# Patient Record
Sex: FEMALE | Race: WHITE | NOT HISPANIC OR LATINO | Employment: FULL TIME | URBAN - METROPOLITAN AREA
[De-identification: names, ages, dates, MRNs, and addresses within clinical notes are randomized per-mention and may not be internally consistent; named-entity substitution may affect disease eponyms.]

---

## 2023-02-18 ENCOUNTER — APPOINTMENT (OUTPATIENT)
Dept: GENERAL RADIOLOGY | Age: 53
End: 2023-02-18
Attending: EMERGENCY MEDICINE

## 2023-02-18 ENCOUNTER — HOSPITAL ENCOUNTER (EMERGENCY)
Age: 53
Discharge: HOME | End: 2023-02-18

## 2023-02-18 VITALS
HEART RATE: 69 BPM | RESPIRATION RATE: 18 BRPM | WEIGHT: 194.12 LBS | BODY MASS INDEX: 26.29 KG/M2 | TEMPERATURE: 97.6 F | HEIGHT: 72 IN | SYSTOLIC BLOOD PRESSURE: 114 MMHG | DIASTOLIC BLOOD PRESSURE: 73 MMHG | OXYGEN SATURATION: 99 %

## 2023-02-18 DIAGNOSIS — S52.501A CLOSED FRACTURE OF DISTAL END OF RIGHT RADIUS, UNSPECIFIED FRACTURE MORPHOLOGY, INITIAL ENCOUNTER: Primary | ICD-10-CM

## 2023-02-18 PROCEDURE — 73130 X-RAY EXAM OF HAND: CPT | Performed by: RADIOLOGY

## 2023-02-18 PROCEDURE — 73110 X-RAY EXAM OF WRIST: CPT

## 2023-02-18 PROCEDURE — 10002803 HB RX 637: Performed by: STUDENT IN AN ORGANIZED HEALTH CARE EDUCATION/TRAINING PROGRAM

## 2023-02-18 PROCEDURE — 99283 EMERGENCY DEPT VISIT LOW MDM: CPT

## 2023-02-18 PROCEDURE — 99284 EMERGENCY DEPT VISIT MOD MDM: CPT | Performed by: STUDENT IN AN ORGANIZED HEALTH CARE EDUCATION/TRAINING PROGRAM

## 2023-02-18 PROCEDURE — 73110 X-RAY EXAM OF WRIST: CPT | Performed by: RADIOLOGY

## 2023-02-18 PROCEDURE — A4565 SLINGS: HCPCS | Performed by: STUDENT IN AN ORGANIZED HEALTH CARE EDUCATION/TRAINING PROGRAM

## 2023-02-18 PROCEDURE — 73130 X-RAY EXAM OF HAND: CPT

## 2023-02-18 PROCEDURE — 29125 APPL SHORT ARM SPLINT STATIC: CPT | Performed by: STUDENT IN AN ORGANIZED HEALTH CARE EDUCATION/TRAINING PROGRAM

## 2023-02-18 RX ORDER — ONDANSETRON 4 MG/1
4 TABLET, ORALLY DISINTEGRATING ORAL EVERY 6 HOURS
Qty: 10 TABLET | Refills: 0 | Status: SHIPPED | OUTPATIENT
Start: 2023-02-18

## 2023-02-18 RX ORDER — HYDROCODONE BITARTRATE AND ACETAMINOPHEN 5; 325 MG/1; MG/1
1 TABLET ORAL ONCE
Status: COMPLETED | OUTPATIENT
Start: 2023-02-18 | End: 2023-02-18

## 2023-02-18 RX ORDER — IBUPROFEN 800 MG/1
800 TABLET ORAL ONCE
Status: COMPLETED | OUTPATIENT
Start: 2023-02-18 | End: 2023-02-18

## 2023-02-18 RX ORDER — LEVOTHYROXINE SODIUM 112 UG/1
112 TABLET ORAL
COMMUNITY
Start: 2023-01-19

## 2023-02-18 RX ORDER — HYDROCODONE BITARTRATE AND ACETAMINOPHEN 5; 325 MG/1; MG/1
1 TABLET ORAL EVERY 6 HOURS PRN
Qty: 12 TABLET | Refills: 0 | Status: SHIPPED | OUTPATIENT
Start: 2023-02-18 | End: 2023-02-21

## 2023-02-18 RX ORDER — ONDANSETRON 4 MG/1
4 TABLET, ORALLY DISINTEGRATING ORAL ONCE
Status: COMPLETED | OUTPATIENT
Start: 2023-02-18 | End: 2023-02-18

## 2023-02-18 RX ADMIN — IBUPROFEN 800 MG: 800 TABLET, FILM COATED ORAL at 18:35

## 2023-02-18 RX ADMIN — ONDANSETRON 4 MG: 4 TABLET, ORALLY DISINTEGRATING ORAL at 16:51

## 2023-02-18 RX ADMIN — HYDROCODONE BITARTRATE AND ACETAMINOPHEN 1 TABLET: 5; 325 TABLET ORAL at 16:50

## 2023-02-18 ASSESSMENT — ENCOUNTER SYMPTOMS
CONSTIPATION: 0
RECTAL PAIN: 0
FATIGUE: 0
DIZZINESS: 0
WOUND: 0
FEVER: 0
ABDOMINAL DISTENTION: 0
NAUSEA: 1
DIARRHEA: 0
BACK PAIN: 0
COLOR CHANGE: 1
VOMITING: 0
HEADACHES: 0
ABDOMINAL PAIN: 0
CHILLS: 0

## 2023-02-18 ASSESSMENT — PAIN SCALES - GENERAL
PAINLEVEL_OUTOF10: 8
PAINLEVEL_OUTOF10: 8

## 2023-02-18 ASSESSMENT — PAIN DESCRIPTION - PAIN TYPE: TYPE: ACUTE PAIN

## 2023-02-20 ENCOUNTER — TELEPHONE (OUTPATIENT)
Dept: ORTHOPEDICS | Age: 53
End: 2023-02-20

## 2024-10-29 ENCOUNTER — ANESTHESIA EVENT (OUTPATIENT)
Dept: SURGERY | Facility: HOSPITAL | Age: 54
End: 2024-10-29
Payer: COMMERCIAL

## 2024-10-29 ENCOUNTER — HOSPITAL ENCOUNTER (OUTPATIENT)
Facility: HOSPITAL | Age: 54
Setting detail: HOSPITAL OUTPATIENT SURGERY
Discharge: HOME | End: 2024-10-29
Attending: SURGERY | Admitting: SURGERY
Payer: COMMERCIAL

## 2024-10-29 ENCOUNTER — ANESTHESIA (OUTPATIENT)
Dept: SURGERY | Facility: HOSPITAL | Age: 54
End: 2024-10-29
Payer: COMMERCIAL

## 2024-10-29 VITALS
DIASTOLIC BLOOD PRESSURE: 86 MMHG | TEMPERATURE: 98 F | WEIGHT: 176 LBS | HEIGHT: 72 IN | RESPIRATION RATE: 26 BRPM | SYSTOLIC BLOOD PRESSURE: 123 MMHG | HEART RATE: 59 BPM | BODY MASS INDEX: 23.84 KG/M2 | OXYGEN SATURATION: 98 %

## 2024-10-29 LAB
PTH-INTACT SERPL-MCNC: 116.4 PG/ML (ref 18.5–88)
PTH-INTACT SERPL-MCNC: 24.1 PG/ML (ref 18.5–88)

## 2024-10-29 PROCEDURE — 88331 PATH CONSLTJ SURG 1 BLK 1SPC: CPT | Performed by: SURGERY

## 2024-10-29 PROCEDURE — 83970 ASSAY OF PARATHORMONE: CPT | Performed by: SURGERY

## 2024-10-29 PROCEDURE — 0GBP0ZZ EXCISION OF LEFT INFERIOR PARATHYROID GLAND, OPEN APPROACH: ICD-10-PCS | Performed by: SURGERY

## 2024-10-29 PROCEDURE — 88305 TISSUE EXAM BY PATHOLOGIST: CPT | Performed by: SURGERY

## 2024-10-29 RX ORDER — HYDROMORPHONE HYDROCHLORIDE 1 MG/ML
0.4 INJECTION, SOLUTION INTRAMUSCULAR; INTRAVENOUS; SUBCUTANEOUS EVERY 5 MIN PRN
Status: DISCONTINUED | OUTPATIENT
Start: 2024-10-29 | End: 2024-10-29

## 2024-10-29 RX ORDER — MIDAZOLAM HYDROCHLORIDE 1 MG/ML
1 INJECTION INTRAMUSCULAR; INTRAVENOUS EVERY 5 MIN PRN
Status: DISCONTINUED | OUTPATIENT
Start: 2024-10-29 | End: 2024-10-29

## 2024-10-29 RX ORDER — METOCLOPRAMIDE HYDROCHLORIDE 5 MG/ML
INJECTION INTRAMUSCULAR; INTRAVENOUS AS NEEDED
Status: DISCONTINUED | OUTPATIENT
Start: 2024-10-29 | End: 2024-10-29 | Stop reason: SURG

## 2024-10-29 RX ORDER — SODIUM CHLORIDE, SODIUM LACTATE, POTASSIUM CHLORIDE, CALCIUM CHLORIDE 600; 310; 30; 20 MG/100ML; MG/100ML; MG/100ML; MG/100ML
INJECTION, SOLUTION INTRAVENOUS CONTINUOUS
Status: DISCONTINUED | OUTPATIENT
Start: 2024-10-29 | End: 2024-10-29

## 2024-10-29 RX ORDER — LABETALOL HYDROCHLORIDE 5 MG/ML
5 INJECTION, SOLUTION INTRAVENOUS EVERY 5 MIN PRN
Status: DISCONTINUED | OUTPATIENT
Start: 2024-10-29 | End: 2024-10-29

## 2024-10-29 RX ORDER — MIDAZOLAM HYDROCHLORIDE 1 MG/ML
INJECTION INTRAMUSCULAR; INTRAVENOUS AS NEEDED
Status: DISCONTINUED | OUTPATIENT
Start: 2024-10-29 | End: 2024-10-29 | Stop reason: SURG

## 2024-10-29 RX ORDER — HYDROCODONE BITARTRATE AND ACETAMINOPHEN 5; 325 MG/1; MG/1
2 TABLET ORAL ONCE AS NEEDED
Status: COMPLETED | OUTPATIENT
Start: 2024-10-29 | End: 2024-10-29

## 2024-10-29 RX ORDER — PROCHLORPERAZINE EDISYLATE 5 MG/ML
5 INJECTION INTRAMUSCULAR; INTRAVENOUS EVERY 8 HOURS PRN
Status: DISCONTINUED | OUTPATIENT
Start: 2024-10-29 | End: 2024-10-29

## 2024-10-29 RX ORDER — DEXAMETHASONE SODIUM PHOSPHATE 4 MG/ML
VIAL (ML) INJECTION AS NEEDED
Status: DISCONTINUED | OUTPATIENT
Start: 2024-10-29 | End: 2024-10-29 | Stop reason: SURG

## 2024-10-29 RX ORDER — PHENYLEPHRINE HCL 10 MG/ML
VIAL (ML) INJECTION AS NEEDED
Status: DISCONTINUED | OUTPATIENT
Start: 2024-10-29 | End: 2024-10-29 | Stop reason: SURG

## 2024-10-29 RX ORDER — HYDROCODONE BITARTRATE AND ACETAMINOPHEN 5; 325 MG/1; MG/1
1 TABLET ORAL ONCE AS NEEDED
Status: COMPLETED | OUTPATIENT
Start: 2024-10-29 | End: 2024-10-29

## 2024-10-29 RX ORDER — NALOXONE HYDROCHLORIDE 0.4 MG/ML
80 INJECTION, SOLUTION INTRAMUSCULAR; INTRAVENOUS; SUBCUTANEOUS AS NEEDED
Status: DISCONTINUED | OUTPATIENT
Start: 2024-10-29 | End: 2024-10-29

## 2024-10-29 RX ORDER — ACETAMINOPHEN 500 MG
1000 TABLET ORAL ONCE AS NEEDED
Status: COMPLETED | OUTPATIENT
Start: 2024-10-29 | End: 2024-10-29

## 2024-10-29 RX ORDER — LIDOCAINE HYDROCHLORIDE 10 MG/ML
INJECTION, SOLUTION EPIDURAL; INFILTRATION; INTRACAUDAL; PERINEURAL AS NEEDED
Status: DISCONTINUED | OUTPATIENT
Start: 2024-10-29 | End: 2024-10-29 | Stop reason: SURG

## 2024-10-29 RX ORDER — ACETAMINOPHEN 500 MG
TABLET ORAL
Status: COMPLETED
Start: 2024-10-29 | End: 2024-10-29

## 2024-10-29 RX ORDER — ROCURONIUM BROMIDE 10 MG/ML
INJECTION, SOLUTION INTRAVENOUS AS NEEDED
Status: DISCONTINUED | OUTPATIENT
Start: 2024-10-29 | End: 2024-10-29 | Stop reason: SURG

## 2024-10-29 RX ORDER — HYDROMORPHONE HYDROCHLORIDE 1 MG/ML
0.2 INJECTION, SOLUTION INTRAMUSCULAR; INTRAVENOUS; SUBCUTANEOUS EVERY 5 MIN PRN
Status: DISCONTINUED | OUTPATIENT
Start: 2024-10-29 | End: 2024-10-29

## 2024-10-29 RX ORDER — BUPIVACAINE HYDROCHLORIDE 5 MG/ML
INJECTION, SOLUTION EPIDURAL; INTRACAUDAL AS NEEDED
Status: DISCONTINUED | OUTPATIENT
Start: 2024-10-29 | End: 2024-10-29 | Stop reason: HOSPADM

## 2024-10-29 RX ORDER — ONDANSETRON 2 MG/ML
INJECTION INTRAMUSCULAR; INTRAVENOUS AS NEEDED
Status: DISCONTINUED | OUTPATIENT
Start: 2024-10-29 | End: 2024-10-29 | Stop reason: SURG

## 2024-10-29 RX ORDER — SCOLOPAMINE TRANSDERMAL SYSTEM 1 MG/1
1 PATCH, EXTENDED RELEASE TRANSDERMAL ONCE
Status: DISCONTINUED | OUTPATIENT
Start: 2024-10-29 | End: 2024-10-29 | Stop reason: HOSPADM

## 2024-10-29 RX ORDER — HYDROMORPHONE HYDROCHLORIDE 1 MG/ML
0.6 INJECTION, SOLUTION INTRAMUSCULAR; INTRAVENOUS; SUBCUTANEOUS EVERY 5 MIN PRN
Status: DISCONTINUED | OUTPATIENT
Start: 2024-10-29 | End: 2024-10-29

## 2024-10-29 RX ORDER — ONDANSETRON 2 MG/ML
4 INJECTION INTRAMUSCULAR; INTRAVENOUS EVERY 6 HOURS PRN
Status: DISCONTINUED | OUTPATIENT
Start: 2024-10-29 | End: 2024-10-29

## 2024-10-29 RX ORDER — ACETAMINOPHEN 500 MG
1000 TABLET ORAL ONCE
Status: DISCONTINUED | OUTPATIENT
Start: 2024-10-29 | End: 2024-10-29 | Stop reason: HOSPADM

## 2024-10-29 RX ORDER — IBUPROFEN 800 MG/1
800 TABLET, FILM COATED ORAL EVERY 8 HOURS PRN
Qty: 20 TABLET | Refills: 1 | Status: SHIPPED | OUTPATIENT
Start: 2024-10-29 | End: 2024-12-28

## 2024-10-29 RX ORDER — ALBUTEROL SULFATE 0.83 MG/ML
2.5 SOLUTION RESPIRATORY (INHALATION) AS NEEDED
Status: DISCONTINUED | OUTPATIENT
Start: 2024-10-29 | End: 2024-10-29

## 2024-10-29 RX ADMIN — LIDOCAINE HYDROCHLORIDE 50 MG: 10 INJECTION, SOLUTION EPIDURAL; INFILTRATION; INTRACAUDAL; PERINEURAL at 07:35:00

## 2024-10-29 RX ADMIN — ROCURONIUM BROMIDE 10 MG: 10 INJECTION, SOLUTION INTRAVENOUS at 07:55:00

## 2024-10-29 RX ADMIN — PHENYLEPHRINE HCL 100 MCG: 10 MG/ML VIAL (ML) INJECTION at 07:35:00

## 2024-10-29 RX ADMIN — MIDAZOLAM HYDROCHLORIDE 1 MG: 1 INJECTION INTRAMUSCULAR; INTRAVENOUS at 07:30:00

## 2024-10-29 RX ADMIN — SODIUM CHLORIDE, SODIUM LACTATE, POTASSIUM CHLORIDE, CALCIUM CHLORIDE: 600; 310; 30; 20 INJECTION, SOLUTION INTRAVENOUS at 07:25:00

## 2024-10-29 RX ADMIN — DEXAMETHASONE SODIUM PHOSPHATE 4 MG: 4 MG/ML VIAL (ML) INJECTION at 07:45:00

## 2024-10-29 RX ADMIN — ROCURONIUM BROMIDE 10 MG: 10 INJECTION, SOLUTION INTRAVENOUS at 07:35:00

## 2024-10-29 RX ADMIN — METOCLOPRAMIDE HYDROCHLORIDE 10 MG: 5 INJECTION INTRAMUSCULAR; INTRAVENOUS at 07:45:00

## 2024-10-29 RX ADMIN — MIDAZOLAM HYDROCHLORIDE 1 MG: 1 INJECTION INTRAMUSCULAR; INTRAVENOUS at 07:25:00

## 2024-10-29 RX ADMIN — ONDANSETRON 4 MG: 2 INJECTION INTRAMUSCULAR; INTRAVENOUS at 08:30:00

## 2024-10-29 NOTE — SPIRITUAL CARE NOTE
Spiritual Care Visit Note    Patient Name: Cony Falk Date of Spiritual Care Visit: 10/29/24   : 2/10/1970 Primary Dx: <principal problem not specified>       Referred By:      Spiritual Care Taxonomy:    Intended Effects: Convey a calming presence    Methods: Offer spiritual/Sikh support;Offer support;Offer emotional support;Encourage sharing of feelings    Interventions: Acknowledge current situation;Active listening;Explain  role;Crockett;Share words of hope and inspiration    Visit Type/Summary:     - Spiritual Care: Offered empathic listening and emotional support.  assessed patient body language. Patient was teary eyed and confirmed that she is concerned about procedure.  offered emotional support to patient by acknowledging her feelings and sharing words of hope and inspiration through scripture and prayer. Patient and her  thanked  for support.  remains available as needed for follow up.    Spiritual Care support can be requested via an Epic consult. For urgent/immediate needs, please contact the On Call  at: Edward: ext [phone]    Shon. Amy Kessler Mdiv.

## 2024-10-29 NOTE — DISCHARGE INSTRUCTIONS
May remove the gauze tomorrow morning  Leave the sterri strips intact  May shower  Diet and activity as tolerated  No lifting more than 5 pounds  No voice straining for 2 weeks  Ok to take ibuprofen 1 tablet every 8 hours as needed for pain  Ice pack around the incision site for the next 4 to 5 days  Elevate the head of the bed with 2 pillows for 5 days to reduce swelling      1000MG TYLENOL WAS GIVEN AT 0921 AM.     [facility],  1

## 2024-10-29 NOTE — OPERATIVE REPORT
[facility]    PATIENT'S NAME: RUSH THRASHER   ATTENDING PHYSICIAN: Nusrat Last M.D.   OPERATING PHYSICIAN: Nusrat Last M.D.   PATIENT ACCOUNT#:   [id number]    LOCATION:  [facility] 16 [facility] 10  MEDICAL RECORD #:   [id number]       YOB: 1970  ADMISSION DATE:       10/29/2024      OPERATION DATE:  10/29/2024    OPERATIVE REPORT    PREOPERATIVE DIAGNOSIS:  Hyperparathyroidism.  POSTOPERATIVE DIAGNOSIS:  Hyperparathyroidism.  PROCEDURE:  Parathyroidectomy with intraoperative ultrasound, intact PTH assay, and intraoperative frozen section.    ASSISTANT:  BRYCE Horne    ESTIMATED BLOOD LOSS:  2 mL.    SPECIMEN:  Parathyroid gland.    DISPOSITION:  To PACU.    COMPLICATIONS:  None.    SURGICAL FINDING:  Finding left inferior parathyroid gland, hypercellular on frozen section, PTH levels 116.4 and 24.1 and 79% drop in the intact PTH level.    INDICATIONS:  Patient is a 54-year-old female, presented to the office with a diagnosis of hyperparathyroidism.  She noticed some frequent urination and brain fog and constipation.  Her serum calcium level was 10.6 with a normal GFR and a PTH of 83.3 and vitamin D level of 28 and a 24-hour urine calcium was 456.  Ultrasound was done in the office and able to visualize the enlarged parathyroid gland on the left side measuring about 16 mm.  She also had a 4D CT scan which confirmed the ultrasound findings.  Her DEXA scan was within the normal range.  So, based on this, discussed with the patient undergoing a parathyroidectomy.  The risks and benefits of surgery were discussed and patient agreed for the procedure, signed the informed consent.    OPERATIVE TECHNIQUE:  Patient was brought to the operating room, was placed in supine position the operating table.  Lower extremity SCD boots were placed.  After IV sedation and endotracheal intubation, the patient's arms were tucked at side and roll was placed under the shoulder blade, neck was  slightly hyperextended.  Ultrasound was done again and was able to visualize the left inferior parathyroid gland.  After the area was prepped into a sterile field, lower Kocher neck incision was made with a 15 blade.  Electric Bovie cautery was used to separate the subcutaneous tissue, and the superior and inferior flaps raised.  Deep cervical fascia was identified.  Midline incision was made, and the left strap muscle was carefully dissected from the thyroid capsule, and inferior pole of the thyroid gland was medially reflected.  Just behind this, the parathyroid gland was identified.  It was carefully dissected.  Blood vessel was ligated using a 3-0 silk.  The removed gland was sent down to Pathology for evaluation.  There were 2 blood samples obtained, pre-incision and 10-minute postexcision of the gland, and there was a 79% drop in the intact PTH level and then the parathyroid frozen section came back as hypercellular.  At this time, the area was inspected.  There was no evidence of any bleeding.  Good hemostasis noted.  A piece of Surgicel laid and a 3-0 Vicryl was used to close the deep cervical and dermal layer.  Local anesthetic was injected into the surrounding tissue and a 4-0 Monocryl for the skin placing a subcuticular stitch.  Incision was then covered with Steri-Strips, 4 x 4, and tape.  The patient tolerated the procedure well, was extubated in the operating room and transferred to PACU in stable condition.  At the end the case the instrument, and sponge counts all correct.      The surgical assistant was medically necessary for the entire procedure to position patient,  prep the area, drape, retract the wound using instruments to remove the necessary gland, and assist in closing and transferring the patient out of the operating room.    Dictated By Nusrat Last M.D.  d: 10/29/2024 08:53:05  t: 10/29/2024 14:44:09  Job [id number]  TL/

## 2024-10-29 NOTE — ANESTHESIA PREPROCEDURE EVALUATION
PRE-OP EVALUATION    Patient Name: Cony Falk    Admit Diagnosis: HYPERPARATHYROIDISM    Pre-op Diagnosis: HYPERPARATHYROIDISM    PARATHYROIDECTOMY WITH INTRAOPERATIVE ULTRASOUND, INTACT PARATHORMONE ASSAY, NERVE MONITORING    Anesthesia Procedure: PARATHYROIDECTOMY WITH INTRAOPERATIVE ULTRASOUND, INTACT PARATHORMONE ASSAY, NERVE MONITORING    Surgeons and Role:     * Nusrat Last MD - Primary    Pre-op vitals reviewed.  Temp: 97.5 °F (36.4 °C)  Pulse: 55  Resp: 18  BP: 99/69  SpO2: 98 %  Body mass index is 23.87 kg/m².    Current medications reviewed.  Hospital Medications:   acetaminophen (Tylenol Extra Strength) tab 1,000 mg  1,000 mg Oral Once    scopolamine (Transderm-Scop) 1 MG/3DAYS patch 1 patch  1 patch Transdermal Once    lactated ringers infusion   Intravenous Continuous       Outpatient Medications:   Prescriptions Prior to Admission[1]    Allergies: Seasonal      Anesthesia Evaluation    Patient summary reviewed.    Anesthetic Complications  (-) history of anesthetic complications         GI/Hepatic/Renal    Negative GI/hepatic/renal ROS.                             Cardiovascular    Negative cardiovascular ROS.    Exercise tolerance: good     MET: >4                                           Endo/Other           (+) hypothyroidism                       Pulmonary    Negative pulmonary ROS.                       Neuro/Psych    Negative neuro/psych ROS.                                  Past Surgical History:   Procedure Laterality Date      2004    Other surgical history Left     left wrist surgery    Spine surgery procedure unlisted      lumbar microdiscectomy ? 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               Airway      Mallampati: II  Mouth opening: >3 FB  TM distance: > 6 cm  Neck ROM: full Cardiovascular    Cardiovascular exam normal.  Rhythm: regular  Rate: normal     Dental    Dentition appears grossly intact         Pulmonary    Pulmonary exam normal.  Breath sounds clear to auscultation bilaterally.               Other findings              ASA: 2   Plan: general  NPO status verified and patient meets guidelines.  Patient has not taken beta blockers in last 24 hours.  Post-procedure pain management plan discussed with surgeon and patient.    Comment: I spoke with the patient and discussed the risks of general anesthesia, which include nausea and vomiting; sore throat; injury to the lips, gums, teeth, and eyes; cardiac, pulmonary, and neurologic events; aspiration; and allergic reactions. The patient understands these risks and consents to receiving general anesthesia for this procedure.  Plan/risks discussed with: patient and spouse  Use of blood product(s) discussed with: patient    Consented to blood products.          Present on Admission:  **None**             [1]   Medications Prior to Admission   Medication Sig Dispense Refill Last Dose/Taking     LEVOTHYROXINE 112 MCG Oral Tab TAKE 1 TABLET BY MOUTH DAILY 30 tablet 0 10/29/2024 Morning    Multiple Vitamins-Minerals (ALIVE WOMENS ENERGY) Oral Tab Take 1 tablet by mouth 2 (two) times daily. 60 tablet 11 Past Month

## 2024-10-29 NOTE — ANESTHESIA PROCEDURE NOTES
Airway  Date/Time: 10/29/2024 7:36 AM  Urgency: elective    Airway not difficult    General Information and Staff    Patient location during procedure: OR  Anesthesiologist: Shin Benedict MD  Performed: anesthesiologist   Performed by: Shin Benedict MD  Authorized by: Shin Benedict MD      Indications and Patient Condition  Indications for airway management: anesthesia  Sedation level: deep  Preoxygenated: yes  Patient position: sniffing  Mask difficulty assessment: 1 - vent by mask    Final Airway Details  Final airway type: endotracheal airway      Successful airway: ETT  Cuffed: yes   Successful intubation technique: direct laryngoscopy  Facilitating devices/methods: intubating stylet  Endotracheal tube insertion site: oral  Blade: Rosa  Blade size: #3  ETT size (mm): 7.0    Cormack-Lehane Classification: grade I - full view of glottis  Placement verified by: capnometry   Measured from: lips  ETT to lips (cm): 21  Number of attempts at approach: 1  Number of other approaches attempted: 0

## 2024-10-29 NOTE — BRIEF OP NOTE
Pre-Operative Diagnosis: HYPERPARATHYROIDISM     Post-Operative Diagnosis: same as above     Procedure Performed:   PARATHYROIDECTOMY WITH INTRAOPERATIVE ULTRASOUND, INTACT PARATHORMONE ASSAY    Surgeons and Role:     * Nusrat Last MD - Primary    Assistant(s):  Surgical Assistant.: Elisabeth Martin, BRYCE     Surgical Findings: left inferior - hypercellular; 79% drop in intact PTH     Component      Latest Ref Rng 10/29/2024   PTH INTACT      18.5 - 88.0 pg/mL 24.1    PTH INTACT       116.4 (H)      Specimen: parathyroid gland     Estimated Blood Loss: 2 ml    Dictation Number:  na    Nusrat Last MD  10/29/2024  8:21 AM

## 2024-10-29 NOTE — H&P
[facility]  Report of history and physical    Cony Falk Patient Status:  Hospital Outpatient Surgery    2/10/1970 MRN [id number]   Location [facility] PERIOPERATIVE SERVICE Attending Nusrat Last MD   Hosp Day # 0 PCP ARLEEN BRADY     Reason for surgery: hyperparathyroidism    History of Present Illness:  Cony Falk is a a(n) 54 year old female. Patient is here for parathyroidectomy.  She was diagnosed with hyperparathyroidism.  She noticed some frequent urination and brain fog and constipation.  Her mom has hypothyroidism.     History:  Past Medical History:    Back problem    hx of lumbar dinora    Colon polyps    discovered during routine colonoscopy- view report under media    H/O vaginal delivery    Hypothyroid    Internal hemorrhoids    discovered during routine colonoscopy- view report under media    Sciatica    Visual impairment    GLASSES     Past Surgical History:   Procedure Laterality Date      2004    Other surgical history Left     left wrist surgery    Spine surgery procedure unlisted      lumbar microdiscectomy ?     Tubal ligation       Family History   Problem Relation Age of Onset    Heart Disease Father     Stroke Father     Breast Cancer Mother 69        in remission for 3 years    Heart Attack Paternal Grandfather     Cancer Maternal Uncle         Throat cancer    Cancer Maternal Uncle         Brain Cancer    Heart Attack Paternal Uncle 65      reports that she has never smoked. She has never used smokeless tobacco. She reports current alcohol use of about 2.5 standard drinks of alcohol per week. She reports that she does not use drugs.    Allergies:  Allergies[1]    Medications:    Current Facility-Administered Medications:     acetaminophen (Tylenol Extra Strength) tab 1,000 mg, 1,000 mg, Oral, Once    scopolamine (Transderm-Scop) 1 MG/3DAYS patch 1 patch, 1 patch, Transdermal, Once    lactated ringers infusion, , Intravenous,  Continuous    Review of Systems:    GENERAL HEALTH: otherwise feels well, no weight loss, no fever or chills  SKIN: denies any unusual skin   HEENT: denies nasal congestion  RESPIRATORY: denies shortness of breath  CARDIOVASCULAR: no palpitations   GI: denies nausea, vomiting  GENITAL/: no dysuria  MUSCULOSKELETAL: no joint complaints upper or lower extremities  HEMATOLOGY: d denies bruising or excessive bleeding    Physical Exam:  Blood pressure 99/69, pulse 55, temperature 97.5 °F (36.4 °C), resp. rate 18, height 6' (1.829 m), weight 176 lb (79.8 kg), last menstrual period 09/01/2022, SpO2 98%.    General: Alert, orientated x3.  Cooperative.  No apparent distress.  HEENT: Exam is unremarkable.  Lungs: no labored breathing  Cardiac: Regular rate   Abdomen:  soft  Extremities:  No lower extremity edema noted.  Skin: Normal texture and turgor.  Neurologic: Cranial nerves are grossly intact.  Motor strength and sensory examination is grossly normal.  No focal neurologic deficit.    Laboratory Data:     Ultrasound:   Left inferior 8x6x16 mm Parathyroid gland    Lab:   Component  Latest Ref Rng 7/10/2024 7/18/2024   CALCIUM  8.6 - 10.3 mg/dL 10.6 (H)   GFR CKD-EPI  >=60.00 mL/min/1.73 m² 90.66   PTH INTACT  11.1 - 79.5 pg/mL 83.3 (H)   VITAMIN D, 25-HYDROXY  30..00 ng/mL ng/mL 27.77 (L)   CALCIUM, 24 HOUR URINE  mg/24 h 456 (H)       4 D CT:  DATE OF SERVICE: 07.26.2024  CT 4D PARATHYROID(W+WO)(CPT=70492)    HISTORY: Hypercalcemia.    COMPARISON: None.    TECHNIQUE: Pre and postcontrast multiphasic axial images of the neck were obtained utilizing the  4D-CT parathyroid protocol. Coronal and sagittal reformatted images were reviewed. 80 mL  Isovue-370 intravenous contrast was utilized.     Dose reduction CT scan done according to ALARA (As Low as Reasonably Achievable) or ALARA/IMAGE  GENTLY.    FINDINGS:   Pharynx/larynx: The visualized oropharynx, hypopharynx and larynx show no definite abnormality.    Oral  cavity: Evaluation of the oral cavity is limited by dental artifact. The visualized portions  of the oral tongue and floor of mouth are unremarkable.    Glands: The visualized parotid and submandibular salivary glands have a normal appearance without  focal lesions. The thyroid gland is heterogenous.    Parathyroid glands:Streak artifact limits evaluation however there is a 0.9 x 0.5 x 1.4 cm (AP,  transverse, CC) peripherally enhancing nodule within the left tracheoesophageal groove along the  inferior aspect of the left thyroid lobe which may represent a parathyroid gland/adenoma versus  exophytic thyroid nodule.    Lymph nodes: There are small lymph nodes in the supra-and infrahyoid neck that are not enlarged by  size criteria. No lymph nodes with suspicious morphologic or enhancement characteristics are  identified.     Vascular Structures: The cervical vessels have a normal course and caliber.    Osseous structures: There are mild-to-moderate degenerative changes in the cervical spine.     Thoracic Inlet: The lung apices are notable for mild dependent atelectasis.    IMPRESSION:  1. A 1.4 cm nodule within the left tracheoesophageal groove along the inferior aspect of the left  thyroid lobe which may represent a parathyroid gland/adenoma versus exophytic thyroid nodule.  Clinical/laboratory correlation is recommended.  2. Heterogenous thyroid gland.       Sestamibi scan: not done    DEXA scan:   Impression    IMPRESSION: No osteopenia or osteoporosis. Normal bone mineral density.    FINDINGS:  Bone Density:  -----------------------------------------------------------------  Region                   BMD    T-score  Z-score   Classification  -----------------------------------------------------------------  AP Spine(L1-L4)          1.029   -0.2      0.9       Normal  Femoral Neck (Left)      0.838   -0.1      0.9       Normal  Total Hip (Left)         0.904   -0.3      0.3       Normal  Total Forearm (Right)     0.475   -1.9     -1.0        1/3 Forearm (Right)      0.647   -0.8      0.2       Normal  UD Forearm (Right)       0.411   -0.5      0.1        -----------------------------------------------------------------  Impression and Plan: hyperparathyroidism    -will proceed with parathyroidectomy with IOUS, intact PTH assay and IO frozen section today      Nusrat Last MD  10/29/2024  7:17 AM         [1]   Allergies  Allergen Reactions    Seasonal ITCHING     Trees, Grass, Ragweed

## 2024-10-29 NOTE — ANESTHESIA PROCEDURE NOTES
Arterial Line    Date/Time: 10/29/2024 7:42 AM    Performed by: Shin Benedict MD  Authorized by: Shin Benedict MD    General Information and Staff    Procedure Start:  10/29/2024 7:42 AM  Procedure End:  10/29/2024 7:45 AM  Anesthesiologist:  Shin Benedict MD  Performed By:  Anesthesiologist  Patient Location:  OR  Indication: continuous blood pressure monitoring and blood sampling needed    Site Identification: real time ultrasound guided    Preanesthetic Checklist: 2 patient identifiers, IV checked, risks and benefits discussed, monitors and equipment checked, pre-op evaluation, timeout performed, anesthesia consent and sterile technique used    Procedure Details    Catheter Size:  20 G  Catheter Length:  1 and 3/4 inch  Catheter Type:  Arrow  Seldinger Technique?: Yes    Laterality:  Left  Site:  Radial artery  Site Prep: chlorhexidine    Line Secured:  Wrist Brace, tape and Tegaderm    Assessment    Events: patient tolerated procedure well with no complications      Medications  10/29/2024 7:42 AM      Additional Comments

## 2024-10-29 NOTE — ANESTHESIA POSTPROCEDURE EVALUATION
[facility]    Cony Falk Patient Status:  Hospital Outpatient Surgery   Age/Gender 54 year old female MRN [id number]   Location [facility] PERIOPERATIVE SERVICE Attending Nusrat Last MD   Hosp Day # 0 PCP ARLEEN BRADY       Anesthesia Post-op Note    PARATHYROIDECTOMY WITH INTRAOPERATIVE ULTRASOUND, INTACT PARATHORMONE ASSAY    Procedure Summary       Date: 10/29/24 Room / Location:  MAIN OR 03 / [facility] MAIN OR    Anesthesia Start: 0725 Anesthesia Stop: 0907    Procedure: PARATHYROIDECTOMY WITH INTRAOPERATIVE ULTRASOUND, INTACT PARATHORMONE ASSAY (Neck) Diagnosis: (HYPERPARATHYROIDISM)    Surgeons: Nusrat Last MD Anesthesiologist: Shin Benedict MD    Anesthesia Type: general ASA Status: 2            Anesthesia Type: general    Vitals Value Taken Time   /96 10/29/24 0908   Temp 97.8 °F (36.6 °C) 10/29/24 0908   Pulse 57 10/29/24 0908   Resp 26 10/29/24 0908   SpO2 96 % 10/29/24 0908   Vitals shown include unfiled device data.    Patient Location: PACU    Anesthesia Type: general    Airway Patency: patent and extubated    Postop Pain Control: adequate    Mental Status: mildly sedated but able to meaningfully participate in the post-anesthesia evaluation    Nausea/Vomiting: none    Cardiopulmonary/Hydration status: stable euvolemic    Complications: no apparent anesthesia related complications    Postop vital signs: stable    Dental Exam: Unchanged from Preop    Patient to be discharged from PACU when criteria met.

## (undated) DEVICE — SLEEVE COMPR MD KNEE LEN SGL USE KENDALL SCD

## (undated) DEVICE — GLOVE,SURG,SENSICARE SLT,LF,PF,6.5: Brand: MEDLINE

## (undated) DEVICE — ANTIBACTERIAL UNDYED BRAIDED (POLYGLACTIN 910), SYNTHETIC ABSORBABLE SUTURE: Brand: COATED VICRYL

## (undated) DEVICE — SUT PERMA- 3-0 30IN NABSRB BLK TIE SILK

## (undated) DEVICE — SOLUTION IRRIG 1000ML 0.9% NACL USP BTL

## (undated) DEVICE — 3M™ STERI-STRIP™ REINFORCED ADHESIVE SKIN CLOSURES, R1547, 1/2 IN X 4 IN (12 MM X 100 MM), 6 STRIPS/ENVELOPE: Brand: 3M™ STERI-STRIP™

## (undated) DEVICE — THYROID: Brand: MEDLINE INDUSTRIES, INC.

## (undated) DEVICE — 3M™ MICROPORE™ TAPE, 1530-2: Brand: 3M™ MICROPORE™

## (undated) DEVICE — SUT MCRYL 4-0 18IN PS-2 ABSRB UD 19MM 3/8 CIR